# Patient Record
Sex: MALE | URBAN - METROPOLITAN AREA
[De-identification: names, ages, dates, MRNs, and addresses within clinical notes are randomized per-mention and may not be internally consistent; named-entity substitution may affect disease eponyms.]

---

## 2017-01-02 ENCOUNTER — TRANSFERRED RECORDS (OUTPATIENT)
Dept: HEALTH INFORMATION MANAGEMENT | Facility: CLINIC | Age: 22
End: 2017-01-02

## 2017-01-04 ENCOUNTER — TRANSFERRED RECORDS (OUTPATIENT)
Dept: HEALTH INFORMATION MANAGEMENT | Facility: CLINIC | Age: 22
End: 2017-01-04

## 2017-01-06 ENCOUNTER — TRANSFERRED RECORDS (OUTPATIENT)
Dept: HEALTH INFORMATION MANAGEMENT | Facility: CLINIC | Age: 22
End: 2017-01-06

## 2017-01-23 ENCOUNTER — TRANSFERRED RECORDS (OUTPATIENT)
Dept: HEALTH INFORMATION MANAGEMENT | Facility: CLINIC | Age: 22
End: 2017-01-23

## 2017-02-21 ENCOUNTER — MEDICAL CORRESPONDENCE (OUTPATIENT)
Dept: HEALTH INFORMATION MANAGEMENT | Facility: CLINIC | Age: 22
End: 2017-02-21

## 2017-02-27 ENCOUNTER — MEDICAL CORRESPONDENCE (OUTPATIENT)
Dept: HEALTH INFORMATION MANAGEMENT | Facility: CLINIC | Age: 22
End: 2017-02-27

## 2017-02-27 ENCOUNTER — TRANSFERRED RECORDS (OUTPATIENT)
Dept: HEALTH INFORMATION MANAGEMENT | Facility: CLINIC | Age: 22
End: 2017-02-27

## 2017-03-03 ENCOUNTER — RADIANT APPOINTMENT (OUTPATIENT)
Dept: CARDIOLOGY | Facility: CLINIC | Age: 22
End: 2017-03-03
Attending: INTERNAL MEDICINE

## 2017-03-03 ENCOUNTER — PRE VISIT (OUTPATIENT)
Dept: CARDIOLOGY | Facility: CLINIC | Age: 22
End: 2017-03-03

## 2017-03-03 DIAGNOSIS — R00.2 PALPITATIONS: ICD-10-CM

## 2017-03-03 DIAGNOSIS — R00.2 PALPITATIONS: Primary | ICD-10-CM

## 2017-03-03 LAB — TSH SERPL DL<=0.005 MIU/L-ACNC: 1.13 MU/L (ref 0.4–4)

## 2017-03-03 RX ORDER — BUTALBITAL, ACETAMINOPHEN AND CAFFEINE 50; 325; 40 MG/1; MG/1; MG/1
1 TABLET ORAL EVERY 4 HOURS PRN
COMMUNITY
End: 2017-03-03

## 2017-03-03 RX ORDER — FLUTICASONE PROPIONATE 50 MCG
1 SPRAY, SUSPENSION (ML) NASAL DAILY
COMMUNITY

## 2017-03-06 ENCOUNTER — OFFICE VISIT (OUTPATIENT)
Dept: CARDIOLOGY | Facility: CLINIC | Age: 22
End: 2017-03-06
Attending: INTERNAL MEDICINE
Payer: COMMERCIAL

## 2017-03-06 VITALS
OXYGEN SATURATION: 98 % | BODY MASS INDEX: 26.52 KG/M2 | DIASTOLIC BLOOD PRESSURE: 81 MMHG | WEIGHT: 200.1 LBS | HEIGHT: 73 IN | SYSTOLIC BLOOD PRESSURE: 123 MMHG | HEART RATE: 65 BPM

## 2017-03-06 DIAGNOSIS — R00.2 PALPITATIONS: ICD-10-CM

## 2017-03-06 DIAGNOSIS — R00.0 SINUS TACHYCARDIA: Primary | ICD-10-CM

## 2017-03-06 PROCEDURE — 0298T ZZC EXT ECG > 48HR TO 21 DAY REVIEW AND INTERPRETATN: CPT | Performed by: INTERNAL MEDICINE

## 2017-03-06 PROCEDURE — 93010 ELECTROCARDIOGRAM REPORT: CPT | Mod: ZP | Performed by: INTERNAL MEDICINE

## 2017-03-06 PROCEDURE — 0296T ZZHC  EXT ECG > 48HR TO 21 DAY RCRD W/CONECT INTL RCRD: CPT | Mod: ZF

## 2017-03-06 PROCEDURE — 99204 OFFICE O/P NEW MOD 45 MIN: CPT | Performed by: INTERNAL MEDICINE

## 2017-03-06 PROCEDURE — 99212 OFFICE O/P EST SF 10 MIN: CPT | Mod: ZF

## 2017-03-06 PROCEDURE — 0296T ZIO PATCH HOLTER: CPT | Mod: ZF | Performed by: INTERNAL MEDICINE

## 2017-03-06 PROCEDURE — 93005 ELECTROCARDIOGRAM TRACING: CPT | Mod: ZF

## 2017-03-06 ASSESSMENT — PAIN SCALES - GENERAL: PAINLEVEL: NO PAIN (0)

## 2017-03-06 NOTE — LETTER
3/6/2017      RE: Jeovany Machado  [address]  [facility] [address]       Dear Colleague,    Thank you for the opportunity to participate in the care of your patient, Jeovany Machado, at the [facility] HEART [facility]. Please see a copy of my visit note below.    HPI: PURPOSE OF VISIT:  I was requested by Dr. Chad Becerra at [facility] to consult on palpitations.      HISTORY OF PRESENT ILLNESS:  Mr. Jeovany Machado is a 21-year-old gentleman without any significant past medical history whom I have been requested by Dr. Chad Becerra at the [facility] to consult on palpitations.      The patient has been in good health until late last year when he started to have chronic headaches.  He was started on nortriptyline and amitriptyline, which have helped with his headaches.  In 11/2016, the patient started to notice a racing heartbeat sensation or palpitations mostly when he exercises.  He would notice heart rates going up to more than 200 beats per minute when he exercised.  He was seen by a health facility in [place].  He wore a 24-hour Holter and was found to have episodes of sinus tachycardia, correlating with symptoms.  He was most recently seen at [facility] who made the referral to the Heart Rhythm Clinic here at the [facility].      The patient does not have any significant past medical history:  The patient denies any exertional dyspnea, exertional angina, frequent lightheadedness, presyncope or syncope.  He has been started on Inderal and that has helped to control his symptoms; he no longer has these palpitation episodes and the Inderal also has helped with his headaches.           PAST MEDICAL HISTORY:  No past medical history on file.    CURRENT MEDICATIONS:  Current Outpatient Prescriptions   Medication Sig Dispense Refill     fluticasone (FLONASE) 50 MCG/ACT spray Spray 1 spray into both nostrils  "daily PRN       Propranolol HCl (INDERAL LA PO) Take 80 mg by mouth daily       cetirizine ( CETIRIZINE HCL) 10 MG tablet PRN         PAST SURGICAL HISTORY:  No past surgical history on file.    ALLERGIES:   No Known Allergies    FAMILY HISTORY:  - Premature coronary artery disease  - Atrial fibrillation  - Sudden cardiac death     SOCIAL HISTORY:  Social History   Substance Use Topics     Smoking status: Never Smoker     Smokeless tobacco: Not on file     Alcohol use Not on file       ROS:   Constitutional: No fever, chills, or sweats. Weight stable.   ENT: No visual disturbance, ear ache, epistaxis, sore throat.   Cardiovascular: As per HPI.   Respiratory: No cough, hemoptysis.    GI: No nausea, vomiting, hematemesis, melena, or hematochezia.   : No hematuria.   Integument: Negative.   Psychiatric: Negative.   Hematologic:  Easy bruising, no easy bleeding.  Neuro: Negative.   Endocrinology: No significant heat or cold intolerance   Musculoskeletal: No myalgia.    Exam:  /81 (BP Location: Right arm, Patient Position: Chair, Cuff Size: Adult Regular)  Pulse 65  Ht 1.854 m (6' 1\")  Wt 90.8 kg (200 lb 1.6 oz)  SpO2 98%  BMI 26.4 kg/m2  GENERAL APPEARANCE: healthy, alert and no distress  HEENT: no icterus, no xanthelasmas, normal pupil size and reaction, normal palate, mucosa moist, no central cyanosis  NECK: no adenopathy, no asymmetry, masses, or scars, thyroid normal to palpation and no bruits, JVP not elevated  RESPIRATORY: lungs clear to auscultation - no rales, rhonchi or wheezes, no use of accessory muscles, no retractions, respirations are unlabored, normal respiratory rate  CARDIOVASCULAR: regular rhythm, normal S1 with physiologic split S2, no S3 or S4 and no murmur, click or rub, precordium quiet with normal PMI.  ABDOMEN: soft, non tender, without hepatosplenomegaly, no masses palpable, bowel sounds normal, aorta not enlarged by palpation, no abdominal bruits  EXTREMITIES: peripheral pulses " normal, no edema, no bruits  NEURO: alert and oriented to person/place/time, normal speech, gait and affect  VASC: Radial, femoral, dorsalis pedis and posterior tibialis pulses are normal in volumes and symmetric bilaterally. No bruits are heard.  SKIN: no ecchymoses, no rashes    Labs:  CBC RESULTS:   Lab Results   Component Value Date    WBC 8.8 12/01/2016    RBC 5.23 12/01/2016    HGB 15.1 12/01/2016    HCT 42.8 12/01/2016    MCV 82 12/01/2016    MCH 28.9 12/01/2016    MCHC 35.3 12/01/2016    RDW 12.3 12/01/2016     12/01/2016       BMP RESULTS:  No results found for: NA, POTASSIUM, CHLORIDE, CO2, ANIONGAP, GLC, BUN, CR, GFRESTIMATED, GFRESTBLACK, JAK     INR RESULTS:  No results found for: INR    Procedures:      Assessment and Plan:     IMPRESSION AND RECOMMENDATIONS:   Palpitations for investigation.      I discussed with the patient the differential diagnosis for symptoms which include inappropriate sinus tachycardia.  The patient had a thyroid function tests done which were normal.  The patient is very concerned about episodes of heart rates exceeding 200 beats per minute.  We will have the patient wear the ZIO Patch monitor for 2 weeks and I encouraged the patient to go about his normal daily life and continue to exercise in order for us to determine the nature of the palpitations.  We have reviewed the rhythm strips from the Holter monitor and they are consistent with normal sinus rhythm and sinus tachycardia.      There is no scheduled followup in the Heart Rhythm Clinic.  We will communicate the results of the ZIO Patch recording to patient.  All questions and concerns were addressed, and patient is happy with plan.  The plan has also been communicated to the patient's primary care provider.       Please do not hesitate to contact me if you have any questions/concerns.     Sincerely,     Susana Mclaughlin MD      CC  Patient Care Team:  No Ref-Primary, Physician as PCP - Chad Delcid  MD as MD (Family Practice)  Susana Mclaughlin MD as MD (Cardiology)  Valencia Julien, RN as Nurse Coordinator (Clinical Cardiac Electrophysiology)

## 2017-03-06 NOTE — MR AVS SNAPSHOT
After Visit Summary   3/6/2017    Jeovany Machado    MRN: [id number]           Patient Information     Date Of Birth          1995        Visit Information        Provider Department      3/6/2017 8:00 AM Susana Mclaughlin MD [facility]        Today's Diagnoses     Sinus tachycardia    -  1    Palpitations          Care Instructions    Cardiology Provider you saw in clinic today: Dr. Mclaughlin     Labs/Tests needed:     1. 14 day ziopatch today     Follow-up Visit:  As needed      You will receive all normal lab and testing results via [employer] or mail if not reviewed in clinic today. Please contact our office if you need assistance with setting up MyChart.    If you need a medication refill please contact your pharmacy. Please allow 3 business days for your refill to be completed.     As always, thank you for trusting us with your health care needs!    If you have any questions regarding your visit please contact your care team:   Cardiology  Telephone Number    Valencia Julien RN  Electrophysiology Nurse Coordinator [phone]     Call for EP procedure scheduling concerns  Bernice Chávez,   EP  [phone]                             Follow-ups after your visit        Follow-up notes from your care team     Return if symptoms worsen or fail to improve.      Who to contact     If you have questions or need follow up information about today's clinic visit or your schedule please contact [facility] directly at [phone].  Normal or non-critical lab and imaging results will be communicated to you by MyChart, letter or phone within 4 business days after the clinic has received the results. If you do not hear from us within 7 days, please contact the clinic through MyChart or phone. If you have a critical or abnormal lab result, we will notify you by phone as soon as possible.  Submit refill requests through [employer] or call your pharmacy and they will forward the refill request to  "us. Please allow 3 business days for your refill to be completed.          Additional Information About Your Visit        [employer] Information     [employer] gives you secure access to your electronic health record. If you see a primary care provider, you can also send messages to your care team and make appointments. If you have questions, please call your primary care clinic.  If you do not have a primary care provider, please call [phone] and they will assist you.        Care EveryWhere ID     This is your Care EveryWhere ID. This could be used by other organizations to access your [facility] medical records  [id number]        Your Vitals Were     Pulse Height Pulse Oximetry BMI (Body Mass Index)          65 1.854 m (6' 1\") 98% 26.4 kg/m2         Blood Pressure from Last 3 Encounters:   03/06/17 123/81   12/01/16 133/68   11/14/16 125/88    Weight from Last 3 Encounters:   03/06/17 90.8 kg (200 lb 1.6 oz)   11/14/16 86.2 kg (190 lb)              We Performed the Following     EKG 12-lead, tracing only (Future)     Zio Patch Holter        Primary Care Provider    Physician No Ref-Primary       No address on file        Thank you!     Thank you for choosing [facility]  for your care. Our goal is always to provide you with excellent care. Hearing back from our patients is one way we can continue to improve our services. Please take a few minutes to complete the written survey that you may receive in the mail after your visit with us. Thank you!             Your Updated Medication List - Protect others around you: Learn how to safely use, store and throw away your medicines at www.disposemymeds.org.          This list is accurate as of: 3/6/17  2:26 PM.  Always use your most recent med list.                   Brand Name Dispense Instructions for use    fluticasone 50 MCG/ACT spray    FLONASE     Spray 1 spray into both nostrils daily PRN       INDERAL LA PO      Take 80 mg by mouth daily       KP CETIRIZINE " HCL 10 MG tablet   Generic drug:  cetirizine      PRN

## 2017-03-06 NOTE — NURSING NOTE
Chief Complaint   Patient presents with     New Patient     Palpitations, racing heart. Labs, echo, EKG.

## 2017-03-06 NOTE — NURSING NOTE
Per Dr. Susana Mclaughlin, patient to have 14 day Ziopatch monitor placed.  Diagnosis: Palpitations  Monitor placed: Yes  Patient Instructed: Yes  Patient verbalized understanding: Yes  Holter #: [id number]    Placed by Romina Freeman CMA

## 2017-03-06 NOTE — PATIENT INSTRUCTIONS
Cardiology Provider you saw in clinic today: Dr. Mclaughlin     Labs/Tests needed:     1. 14 day ziopatch today     Follow-up Visit:  As needed      You will receive all normal lab and testing results via [employer] or mail if not reviewed in clinic today. Please contact our office if you need assistance with setting up MyChart.    If you need a medication refill please contact your pharmacy. Please allow 3 business days for your refill to be completed.     As always, thank you for trusting us with your health care needs!    If you have any questions regarding your visit please contact your care team:   Cardiology  Telephone Number    Valencia Julien RN  Electrophysiology Nurse Coordinator [phone]     Call for EP procedure scheduling concerns  Bernice Chávez,   EP  [phone]

## 2017-03-06 NOTE — PROGRESS NOTES
HPI: PURPOSE OF VISIT:  I was requested by Dr. Chad Becerra at [email or web address] to consult on palpitations.      HISTORY OF PRESENT ILLNESS:  Mr. Jeovany Machado is a 21-year-old gentleman without any significant past medical history whom I have been requested by Dr. Chad Becerra at the [facility] [[email or web address]] to consult on palpitations.      The patient has been in good health until late last year when he started to have chronic headaches.  He was started on nortriptyline and amitriptyline, which have helped with his headaches.  In 11/2016, the patient started to notice a racing heartbeat sensation or palpitations mostly when he exercises.  He would notice heart rates going up to more than 200 beats per minute when he exercised.  He was seen by a health facility in [place].  He wore a 24-hour Holter and was found to have episodes of sinus tachycardia, correlating with symptoms.  He was most recently seen at [facility] [[email or web address]] who made the referral to the Heart Rhythm Clinic here at the [facility].      The patient does not have any significant past medical history:  The patient denies any exertional dyspnea, exertional angina, frequent lightheadedness, presyncope or syncope.  He has been started on Inderal and that has helped to control his symptoms; he no longer has these palpitation episodes and the Inderal also has helped with his headaches.           PAST MEDICAL HISTORY:  No past medical history on file.    CURRENT MEDICATIONS:  Current Outpatient Prescriptions   Medication Sig Dispense Refill     fluticasone (FLONASE) 50 MCG/ACT spray Spray 1 spray into both nostrils daily PRN       Propranolol HCl (INDERAL LA PO) Take 80 mg by mouth daily       cetirizine (KP CETIRIZINE HCL) 10 MG tablet PRN         PAST SURGICAL HISTORY:  No past surgical history on file.    ALLERGIES:   No Known Allergies    FAMILY HISTORY:  - Premature coronary artery disease  - Atrial fibrillation  -  "Sudden cardiac death     SOCIAL HISTORY:  Social History   Substance Use Topics     Smoking status: Never Smoker     Smokeless tobacco: Not on file     Alcohol use Not on file       ROS:   Constitutional: No fever, chills, or sweats. Weight stable.   ENT: No visual disturbance, ear ache, epistaxis, sore throat.   Cardiovascular: As per HPI.   Respiratory: No cough, hemoptysis.    GI: No nausea, vomiting, hematemesis, melena, or hematochezia.   : No hematuria.   Integument: Negative.   Psychiatric: Negative.   Hematologic:  Easy bruising, no easy bleeding.  Neuro: Negative.   Endocrinology: No significant heat or cold intolerance   Musculoskeletal: No myalgia.    Exam:  /81 (BP Location: Right arm, Patient Position: Chair, Cuff Size: Adult Regular)  Pulse 65  Ht 1.854 m (6' 1\")  Wt 90.8 kg (200 lb 1.6 oz)  SpO2 98%  BMI 26.4 kg/m2  GENERAL APPEARANCE: healthy, alert and no distress  HEENT: no icterus, no xanthelasmas, normal pupil size and reaction, normal palate, mucosa moist, no central cyanosis  NECK: no adenopathy, no asymmetry, masses, or scars, thyroid normal to palpation and no bruits, JVP not elevated  RESPIRATORY: lungs clear to auscultation - no rales, rhonchi or wheezes, no use of accessory muscles, no retractions, respirations are unlabored, normal respiratory rate  CARDIOVASCULAR: regular rhythm, normal S1 with physiologic split S2, no S3 or S4 and no murmur, click or rub, precordium quiet with normal PMI.  ABDOMEN: soft, non tender, without hepatosplenomegaly, no masses palpable, bowel sounds normal, aorta not enlarged by palpation, no abdominal bruits  EXTREMITIES: peripheral pulses normal, no edema, no bruits  NEURO: alert and oriented to person/place/time, normal speech, gait and affect  VASC: Radial, femoral, dorsalis pedis and posterior tibialis pulses are normal in volumes and symmetric bilaterally. No bruits are heard.  SKIN: no ecchymoses, no rashes    Labs:  CBC RESULTS:   Lab Results "   Component Value Date    WBC 8.8 12/01/2016    RBC 5.23 12/01/2016    HGB 15.1 12/01/2016    HCT 42.8 12/01/2016    MCV 82 12/01/2016    MCH 28.9 12/01/2016    MCHC 35.3 12/01/2016    RDW 12.3 12/01/2016     12/01/2016       BMP RESULTS:  No results found for: NA, POTASSIUM, CHLORIDE, CO2, ANIONGAP, GLC, BUN, CR, GFRESTIMATED, GFRESTBLACK, JAK     INR RESULTS:  No results found for: INR    Procedures:      Assessment and Plan:     IMPRESSION AND RECOMMENDATIONS:   Palpitations for investigation.      I discussed with the patient the differential diagnosis for symptoms which include inappropriate sinus tachycardia.  The patient had a thyroid function tests done which were normal.  The patient is very concerned about episodes of heart rates exceeding 200 beats per minute.  We will have the patient wear the ZIO Patch monitor for 2 weeks and I encouraged the patient to go about his normal daily life and continue to exercise in order for us to determine the nature of the palpitations.  We have reviewed the rhythm strips from the Holter monitor and they are consistent with normal sinus rhythm and sinus tachycardia.      There is no scheduled followup in the Heart Rhythm Clinic.  We will communicate the results of the ZIO Patch recording to patient.  All questions and concerns were addressed, and patient is happy with plan.  The plan has also been communicated to the patient's primary care provider.             CC  Patient Care Team:  No Ref-Primary, Physician as PCP - General  Chad Childers MD as MD (Family Practice)  Chad Becerra MD as Referring Physician  Susana Mclaughlin MD as MD (Cardiology)  Valencia Julien RN as Nurse Coordinator (Clinical Cardiac Electrophysiology)  Susana Mclaughlin MD as MD (Cardiology)  CHAD BECERRA

## 2017-03-07 LAB — INTERPRETATION ECG - MUSE: NORMAL

## 2020-03-10 ENCOUNTER — HEALTH MAINTENANCE LETTER (OUTPATIENT)
Age: 25
End: 2020-03-10

## 2020-12-27 ENCOUNTER — HEALTH MAINTENANCE LETTER (OUTPATIENT)
Age: 25
End: 2020-12-27

## 2021-04-24 ENCOUNTER — HEALTH MAINTENANCE LETTER (OUTPATIENT)
Age: 26
End: 2021-04-24

## 2021-10-09 ENCOUNTER — HEALTH MAINTENANCE LETTER (OUTPATIENT)
Age: 26
End: 2021-10-09

## 2022-05-16 ENCOUNTER — HEALTH MAINTENANCE LETTER (OUTPATIENT)
Age: 27
End: 2022-05-16

## 2022-09-11 ENCOUNTER — HEALTH MAINTENANCE LETTER (OUTPATIENT)
Age: 27
End: 2022-09-11

## 2023-06-03 ENCOUNTER — HEALTH MAINTENANCE LETTER (OUTPATIENT)
Age: 28
End: 2023-06-03